# Patient Record
Sex: MALE | Race: WHITE | Employment: FULL TIME | URBAN - METROPOLITAN AREA
[De-identification: names, ages, dates, MRNs, and addresses within clinical notes are randomized per-mention and may not be internally consistent; named-entity substitution may affect disease eponyms.]

---

## 2020-06-24 ENCOUNTER — HOSPITAL ENCOUNTER (EMERGENCY)
Facility: CLINIC | Age: 40
Discharge: ANOTHER HOSPITAL | End: 2020-06-24
Attending: EMERGENCY MEDICINE | Admitting: EMERGENCY MEDICINE

## 2020-06-24 VITALS
RESPIRATION RATE: 18 BRPM | TEMPERATURE: 97.8 F | HEART RATE: 77 BPM | DIASTOLIC BLOOD PRESSURE: 77 MMHG | SYSTOLIC BLOOD PRESSURE: 124 MMHG | OXYGEN SATURATION: 98 %

## 2020-06-24 DIAGNOSIS — S66.821A EXTENSOR TENDON LACERATION OF RIGHT HAND WITH OPEN WOUND, INITIAL ENCOUNTER: ICD-10-CM

## 2020-06-24 DIAGNOSIS — S61.401A EXTENSOR TENDON LACERATION OF RIGHT HAND WITH OPEN WOUND, INITIAL ENCOUNTER: ICD-10-CM

## 2020-06-24 PROCEDURE — 90471 IMMUNIZATION ADMIN: CPT | Performed by: EMERGENCY MEDICINE

## 2020-06-24 PROCEDURE — 99283 EMERGENCY DEPT VISIT LOW MDM: CPT | Mod: Z6 | Performed by: EMERGENCY MEDICINE

## 2020-06-24 PROCEDURE — 25000128 H RX IP 250 OP 636: Performed by: EMERGENCY MEDICINE

## 2020-06-24 PROCEDURE — 90715 TDAP VACCINE 7 YRS/> IM: CPT | Performed by: EMERGENCY MEDICINE

## 2020-06-24 PROCEDURE — 99285 EMERGENCY DEPT VISIT HI MDM: CPT | Mod: 25 | Performed by: EMERGENCY MEDICINE

## 2020-06-24 RX ORDER — BUPIVACAINE HYDROCHLORIDE AND EPINEPHRINE 5; 5 MG/ML; UG/ML
3 INJECTION, SOLUTION PERINEURAL ONCE
Status: COMPLETED | OUTPATIENT
Start: 2020-06-24 | End: 2020-06-24

## 2020-06-24 RX ADMIN — CLOSTRIDIUM TETANI TOXOID ANTIGEN (FORMALDEHYDE INACTIVATED), CORYNEBACTERIUM DIPHTHERIAE TOXOID ANTIGEN (FORMALDEHYDE INACTIVATED), BORDETELLA PERTUSSIS TOXOID ANTIGEN (GLUTARALDEHYDE INACTIVATED), BORDETELLA PERTUSSIS FILAMENTOUS HEMAGGLUTININ ANTIGEN (FORMALDEHYDE INACTIVATED), BORDETELLA PERTUSSIS PERTACTIN ANTIGEN, AND BORDETELLA PERTUSSIS FIMBRIAE 2/3 ANTIGEN 0.5 ML: 5; 2; 2.5; 5; 3; 5 INJECTION, SUSPENSION INTRAMUSCULAR at 15:46

## 2020-06-24 RX ADMIN — BUPIVACAINE HYDROCHLORIDE AND EPINEPHRINE 3 ML: 5; 5 INJECTION, SOLUTION PERINEURAL at 15:49

## 2020-06-24 NOTE — ED TRIAGE NOTES
Laceration to the right hand from a . Patient states it happened around 1400. Has been holding pressure since. Bleeding controlled and clean dressing applied in triage.

## 2020-06-24 NOTE — ED PROVIDER NOTES
History     Chief Complaint   Patient presents with     Hand Laceration     HPI  Deonte Horta is a 40 year old male who presents with a right thumb injury.  He was using the cut off we will on a  when he suffered an injury to the right thumb just proximal to the MP joint.  No other injury.  He is not able to extend the thumb.  Last tetanus is unknown.    Allergies:  No Known Allergies    Problem List:    There are no active problems to display for this patient.       Past Medical History:    No past medical history on file.    Past Surgical History:    Past Surgical History:   Procedure Laterality Date     NO HISTORY OF SURGERY         Family History:    Family History   Problem Relation Age of Onset     C.A.D. No family hx of      Diabetes No family hx of      Cancer - colorectal No family hx of      Prostate Cancer No family hx of        Social History:  Marital Status:  Single [1]  Social History     Tobacco Use     Smoking status: Current Every Day Smoker     Packs/day: 0.50     Years: 8.00     Pack years: 4.00     Types: Cigarettes   Substance Use Topics     Alcohol use: No     Drug use: No        Medications:    DOXYCYCLINE HYCLATE 100 MG OR CAPS          Review of Systems  All other systems are reviewed and are negative    Physical Exam   BP: 126/84  Pulse: 82  Temp: 97.8  F (36.6  C)  Resp: 18  SpO2: 98 %      Physical Exam  Constitutional:       General: He is not in acute distress.     Appearance: He is not diaphoretic.   HENT:      Head: Atraumatic.   Eyes:      General: No scleral icterus.     Pupils: Pupils are equal, round, and reactive to light.   Pulmonary:      Effort: No respiratory distress.   Abdominal:      General: Bowel sounds are normal.      Palpations: Abdomen is soft.      Tenderness: There is no abdominal tenderness.   Musculoskeletal:         General: No tenderness.      Comments: Deep laceration over the dorsum of the right thumb just proximal to the MP joint.  Unable to  visualize tendons, but he has no extension to the thumb.  Distal circulation of the thumb is intact.   Skin:     General: Skin is warm.      Findings: No rash.         ED Course        Procedures  Infiltrated the area locally with 3 cc of Sensorcaine with epinephrine and explored the wound.  No obvious foreign body.             Critical Care time:  none               No results found for this or any previous visit (from the past 24 hour(s)).    Medications   Tdap (tetanus-diphtheria-acell pertussis) (ADACEL) injection 0.5 mL (0.5 mLs Intramuscular Given 6/24/20 0489)   bupivacaine 0.5 % -  EPINEPHrine 1:200,000 injection 3 mL (3 mLs Intradermal Given by Other 6/24/20 1573)       Assessments & Plan (with Medical Decision Making)  40-year-old male with deep right thumb laceration and apparent disruption of extensor tendon.  He will need hand surgery attention.  Case was accepted at [facility] by Dr. Escobar.  His tetanus shot  immunization was updated.     I have reviewed the nursing notes.    I have reviewed the findings, diagnosis, plan and need for follow up with the patient.       New Prescriptions    No medications on file       Final diagnoses:   Extensor tendon laceration of right hand with open wound, initial encounter       6/24/2020   [facility] EMERGENCY DEPARTMENT     Mick Up MD  06/24/20 6474